# Patient Record
Sex: MALE | Race: WHITE | NOT HISPANIC OR LATINO | URBAN - NONMETROPOLITAN AREA
[De-identification: names, ages, dates, MRNs, and addresses within clinical notes are randomized per-mention and may not be internally consistent; named-entity substitution may affect disease eponyms.]

---

## 2021-06-30 NOTE — PATIENT DISCUSSION
Discussed with patient, Brow Lift with plastic surgeon prior to Levator Repair for the best treatment.  Patient agrees and would like to consult with them first.

## 2021-06-30 NOTE — PATIENT DISCUSSION
Discussed risks/benefits and alternatives to surgery. Recommend Levator Repair to bilateral upper eyelids.

## 2021-07-23 NOTE — PATIENT DISCUSSION
Ptosis surgery was discussed with the patient. All options reviewed including no treatment vs surgical repair. The surgical risks were reviewed including but not limited to bleeding, scarring, infection, under/overcorrection. Patient understands that more than one surgery may be necessary to achieve the desired result. All questions answered. Patient wishes to proceed with surgical repair of ptosis.

## 2021-09-10 NOTE — PATIENT DISCUSSION
4 Week PO: Healed well. Instructed patient to massage lids with Vitamin E on temporal or nasal aspects.

## 2023-04-25 ENCOUNTER — ESTABLISHED PATIENT (OUTPATIENT)
Dept: URBAN - NONMETROPOLITAN AREA CLINIC 1 | Facility: CLINIC | Age: 38
End: 2023-04-25

## 2023-04-25 DIAGNOSIS — H52.13: ICD-10-CM

## 2023-04-25 PROCEDURE — 92004 COMPRE OPH EXAM NEW PT 1/>: CPT

## 2023-04-25 PROCEDURE — 92310-N CONTACT LENS FITTING NEW PATIENT

## 2023-04-25 PROCEDURE — 92015 DETERMINE REFRACTIVE STATE: CPT

## 2023-04-25 ASSESSMENT — VISUAL ACUITY
OU_CC: 20/20
OD_CC: 20/20-2
OS_CC: 20/20-2
OU_CC: 20/20-1

## 2023-04-25 ASSESSMENT — KERATOMETRY
OD_AXISANGLE2_DEGREES: 97
OD_K2POWER_DIOPTERS: 46.50
OD_AXISANGLE_DEGREES: 007
OS_K1POWER_DIOPTERS: 44.25
OS_AXISANGLE2_DEGREES: 062
OS_K2POWER_DIOPTERS: 45.25
OD_K1POWER_DIOPTERS: 45.50
OS_AXISANGLE_DEGREES: 152

## 2023-04-25 ASSESSMENT — TONOMETRY
OD_IOP_MMHG: 15
OS_IOP_MMHG: 15

## 2024-04-30 ENCOUNTER — ESTABLISHED PATIENT (OUTPATIENT)
Dept: URBAN - NONMETROPOLITAN AREA CLINIC 1 | Facility: CLINIC | Age: 39
End: 2024-04-30

## 2024-04-30 DIAGNOSIS — H52.223: ICD-10-CM

## 2024-04-30 DIAGNOSIS — H52.13: ICD-10-CM

## 2024-04-30 PROCEDURE — 92015 DETERMINE REFRACTIVE STATE: CPT

## 2024-04-30 PROCEDURE — 92310-E CONTACT LENS FITTING ESTABLISH PATIENT

## 2024-04-30 PROCEDURE — 92014 COMPRE OPH EXAM EST PT 1/>: CPT

## 2024-04-30 ASSESSMENT — TONOMETRY
OD_IOP_MMHG: 14
OS_IOP_MMHG: 14

## 2024-04-30 ASSESSMENT — KERATOMETRY
OD_K1POWER_DIOPTERS: 45.50
OS_AXISANGLE_DEGREES: 152
OD_AXISANGLE2_DEGREES: 97
OS_K2POWER_DIOPTERS: 45.25
OD_AXISANGLE_DEGREES: 007
OD_K2POWER_DIOPTERS: 46.50
OS_AXISANGLE2_DEGREES: 062
OS_K1POWER_DIOPTERS: 44.25

## 2024-04-30 ASSESSMENT — VISUAL ACUITY
OD_CC: 20/20
OS_CC: 20/20
OU_CC: 20/20

## 2025-05-06 ENCOUNTER — COMPREHENSIVE EXAM (OUTPATIENT)
Age: 40
End: 2025-05-06

## 2025-05-06 DIAGNOSIS — H52.13: ICD-10-CM

## 2025-05-06 DIAGNOSIS — H52.223: ICD-10-CM

## 2025-05-06 PROCEDURE — 92015 DETERMINE REFRACTIVE STATE: CPT

## 2025-05-06 PROCEDURE — 92014 COMPRE OPH EXAM EST PT 1/>: CPT

## 2025-05-06 PROCEDURE — 92310-1 LEVEL 1 SOFT LENS UPDATE
